# Patient Record
Sex: MALE | URBAN - METROPOLITAN AREA
[De-identification: names, ages, dates, MRNs, and addresses within clinical notes are randomized per-mention and may not be internally consistent; named-entity substitution may affect disease eponyms.]

---

## 2021-02-23 ENCOUNTER — APPOINTMENT (RX ONLY)
Dept: URBAN - METROPOLITAN AREA CLINIC 150 | Facility: CLINIC | Age: 81
Setting detail: DERMATOLOGY
End: 2021-02-23

## 2021-02-23 VITALS — TEMPERATURE: 96.8 F

## 2021-02-23 DIAGNOSIS — I83.1 VARICOSE VEINS OF LOWER EXTREMITIES WITH INFLAMMATION: ICD-10-CM

## 2021-02-23 DIAGNOSIS — Z71.89 OTHER SPECIFIED COUNSELING: ICD-10-CM

## 2021-02-23 PROBLEM — I83.12 VARICOSE VEINS OF LEFT LOWER EXTREMITY WITH INFLAMMATION: Status: ACTIVE | Noted: 2021-02-23

## 2021-02-23 PROBLEM — I83.11 VARICOSE VEINS OF RIGHT LOWER EXTREMITY WITH INFLAMMATION: Status: ACTIVE | Noted: 2021-02-23

## 2021-02-23 PROCEDURE — ? VENIPUNCTURE

## 2021-02-23 PROCEDURE — 36415 COLL VENOUS BLD VENIPUNCTURE: CPT

## 2021-02-23 PROCEDURE — ? ORDER TESTS

## 2021-02-23 PROCEDURE — ? ADDITIONAL NOTES

## 2021-02-23 PROCEDURE — 99203 OFFICE O/P NEW LOW 30 MIN: CPT | Mod: 25

## 2021-02-23 PROCEDURE — ? PRESCRIPTION

## 2021-02-23 PROCEDURE — ? REFERRAL CORRESPONDENCE

## 2021-02-23 PROCEDURE — ? SUNSCREEN RECOMMENDATIONS

## 2021-02-23 PROCEDURE — ? COUNSELING

## 2021-02-23 RX ORDER — CEPHALEXIN 500 MG/1
CAPSULE ORAL
Qty: 14 | Refills: 1 | Status: ERX | COMMUNITY
Start: 2021-02-23

## 2021-02-23 RX ADMIN — CEPHALEXIN: 500 CAPSULE ORAL at 00:00

## 2021-02-23 ASSESSMENT — LOCATION ZONE DERM
LOCATION ZONE: LEG
LOCATION ZONE: LEG

## 2021-02-23 ASSESSMENT — LOCATION DETAILED DESCRIPTION DERM
LOCATION DETAILED: RIGHT DISTAL PRETIBIAL REGION
LOCATION DETAILED: LEFT PROXIMAL PRETIBIAL REGION

## 2021-02-23 ASSESSMENT — LOCATION SIMPLE DESCRIPTION DERM
LOCATION SIMPLE: LEFT PRETIBIAL REGION
LOCATION SIMPLE: RIGHT PRETIBIAL REGION

## 2021-02-23 NOTE — PROCEDURE: ORDER TESTS
Bill For Surgical Tray: no
Billing Type: Third-Party Bill
Clinical Notes (To The Lab): Culture & sensitivity
Expected Date Of Service: 02/23/2021

## 2021-02-23 NOTE — PROCEDURE: ADDITIONAL NOTES
Render Risk Assessment In Note?: no
Detail Level: Simple
Additional Notes: 2/23/21\\nOrdered blood work today in the clinic for this patient as he states he has not had any recent blood work done. Will coordinate care with PCP in [place] as he is seeing him in 2 weeks.\\nDiscussed importance of elevating legs in his recliner when he can. Suggested ace wraps for his legs for extra compression.\\nThis patient lives alone and has a difficult time taking care of himself. Can discuss home health option at follow up visit in 2 weeks.

## 2021-03-01 ENCOUNTER — RX ONLY (OUTPATIENT)
Age: 81
Setting detail: RX ONLY
End: 2021-03-01

## 2021-03-01 RX ORDER — PENICILLIN V POTASSIUM 500 MG/1
TABLET ORAL
Qty: 20 | Refills: 0 | Status: ERX | COMMUNITY
Start: 2021-03-01

## 2021-03-11 ENCOUNTER — APPOINTMENT (RX ONLY)
Dept: URBAN - METROPOLITAN AREA CLINIC 150 | Facility: CLINIC | Age: 81
Setting detail: DERMATOLOGY
End: 2021-03-11

## 2021-03-11 VITALS — TEMPERATURE: 97 F

## 2021-03-11 DIAGNOSIS — I83.1 VARICOSE VEINS OF LOWER EXTREMITIES WITH INFLAMMATION: ICD-10-CM

## 2021-03-11 DIAGNOSIS — Z71.89 OTHER SPECIFIED COUNSELING: ICD-10-CM

## 2021-03-11 PROBLEM — I83.11 VARICOSE VEINS OF RIGHT LOWER EXTREMITY WITH INFLAMMATION: Status: ACTIVE | Noted: 2021-03-11

## 2021-03-11 PROBLEM — I83.12 VARICOSE VEINS OF LEFT LOWER EXTREMITY WITH INFLAMMATION: Status: ACTIVE | Noted: 2021-03-11

## 2021-03-11 PROCEDURE — ? COUNSELING

## 2021-03-11 PROCEDURE — ? ADDITIONAL NOTES

## 2021-03-11 PROCEDURE — ? REFERRAL CORRESPONDENCE

## 2021-03-11 PROCEDURE — 99213 OFFICE O/P EST LOW 20 MIN: CPT

## 2021-03-11 PROCEDURE — ? SUNSCREEN RECOMMENDATIONS

## 2021-03-11 PROCEDURE — ? ORDER TESTS

## 2021-03-11 ASSESSMENT — LOCATION DETAILED DESCRIPTION DERM
LOCATION DETAILED: LEFT PROXIMAL PRETIBIAL REGION
LOCATION DETAILED: RIGHT DISTAL PRETIBIAL REGION

## 2021-03-11 ASSESSMENT — LOCATION SIMPLE DESCRIPTION DERM
LOCATION SIMPLE: LEFT PRETIBIAL REGION
LOCATION SIMPLE: RIGHT PRETIBIAL REGION

## 2021-03-11 ASSESSMENT — LOCATION ZONE DERM
LOCATION ZONE: LEG
LOCATION ZONE: LEG

## 2021-03-11 NOTE — PROCEDURE: MIPS QUALITY
Quality 402: Tobacco Use And Help With Quitting Among Adolescents: Patient screened for tobacco and never smoked
Detail Level: Detailed
Quality 111:Pneumonia Vaccination Status For Older Adults: Pneumococcal Vaccination Previously Received
Quality 226: Preventive Care And Screening: Tobacco Use: Screening And Cessation Intervention: Patient screened for tobacco use and is an ex/non-smoker
Quality 394a: Meningococcal Immunizations For Adolescents: Patient had one dose of meningococcal vaccine (serogroups A, C, W, Y) on or between the patient's 11th and 13th birthdays.
Quality 110: Preventive Care And Screening: Influenza Immunization: Influenza Immunization Administered during Influenza season
Quality 394b: Td/Tdap Immunizations For Adolescents: Patient had one tetanus, diphtheria toxoids and acellular pertussis vaccine (Tdap) on or between the patient's 10th and 13th birthdays.

## 2021-03-11 NOTE — PROCEDURE: ADDITIONAL NOTES
Detail Level: Simple
Additional Notes: GUARDIAN LORIE ORDER/REFERRAL:\\nPlease apply UNNA boots to left and right lower legs twice weekly.
Additional Notes: 3/11/21\\nWill contact [facility] in [place] as this patient needs help at home with his legs since he lives at home alone. He has a hard time being able to reach his legs. He has finished his ABX entirely as prescribed. \\nNew culture completed today and will call patient if needed to start on abx again. \\nApplied Vaseline to his legs today and stressed importance of elevating his legs as often as he can. \\n\\n\\n\\n\\n2/23/21\\nOrdered blood work today in the clinic for this patient as he states he has not had any recent blood work done. Will coordinate care with PCP in [place] as he is seeing him in 2 weeks.\\nDiscussed importance of elevating legs in his recliner when he can. Suggested ace wraps for his legs for extra compression.\\nThis patient lives alone and has a difficult time taking care of himself. Can discuss home health option at follow up visit in 2 weeks.
Render Risk Assessment In Note?: no

## 2021-03-16 ENCOUNTER — RX ONLY (OUTPATIENT)
Age: 81
Setting detail: RX ONLY
End: 2021-03-16

## 2021-03-16 RX ORDER — CEPHALEXIN 500 MG/1
CAPSULE ORAL
Qty: 28 | Refills: 0 | Status: ERX | COMMUNITY
Start: 2021-03-16

## 2021-03-25 ENCOUNTER — APPOINTMENT (RX ONLY)
Dept: URBAN - METROPOLITAN AREA CLINIC 150 | Facility: CLINIC | Age: 81
Setting detail: DERMATOLOGY
End: 2021-03-25

## 2021-03-25 VITALS — TEMPERATURE: 97.2 F

## 2021-03-25 DIAGNOSIS — Z71.89 OTHER SPECIFIED COUNSELING: ICD-10-CM

## 2021-03-25 DIAGNOSIS — I83.1 VARICOSE VEINS OF LOWER EXTREMITIES WITH INFLAMMATION: ICD-10-CM

## 2021-03-25 PROBLEM — I83.12 VARICOSE VEINS OF LEFT LOWER EXTREMITY WITH INFLAMMATION: Status: ACTIVE | Noted: 2021-03-25

## 2021-03-25 PROBLEM — I83.11 VARICOSE VEINS OF RIGHT LOWER EXTREMITY WITH INFLAMMATION: Status: ACTIVE | Noted: 2021-03-25

## 2021-03-25 PROCEDURE — ? SUNSCREEN RECOMMENDATIONS

## 2021-03-25 PROCEDURE — ? ADDITIONAL NOTES

## 2021-03-25 PROCEDURE — ? COUNSELING

## 2021-03-25 PROCEDURE — ? REFERRAL CORRESPONDENCE

## 2021-03-25 PROCEDURE — 99213 OFFICE O/P EST LOW 20 MIN: CPT

## 2021-03-25 PROCEDURE — ? ORDER TESTS

## 2021-03-25 ASSESSMENT — LOCATION DETAILED DESCRIPTION DERM
LOCATION DETAILED: RIGHT DISTAL PRETIBIAL REGION
LOCATION DETAILED: LEFT PROXIMAL PRETIBIAL REGION

## 2021-03-25 ASSESSMENT — LOCATION SIMPLE DESCRIPTION DERM
LOCATION SIMPLE: LEFT PRETIBIAL REGION
LOCATION SIMPLE: RIGHT PRETIBIAL REGION

## 2021-03-25 ASSESSMENT — LOCATION ZONE DERM
LOCATION ZONE: LEG
LOCATION ZONE: LEG

## 2021-03-25 NOTE — PROCEDURE: MIPS QUALITY
Detail Level: Detailed
Quality 110: Preventive Care And Screening: Influenza Immunization: Influenza Immunization Administered during Influenza season
Quality 402: Tobacco Use And Help With Quitting Among Adolescents: Patient screened for tobacco and never smoked
Quality 226: Preventive Care And Screening: Tobacco Use: Screening And Cessation Intervention: Patient screened for tobacco use and is an ex/non-smoker
Quality 394a: Meningococcal Immunizations For Adolescents: Patient had one dose of meningococcal vaccine (serogroups A, C, W, Y) on or between the patient's 11th and 13th birthdays.
Quality 394b: Td/Tdap Immunizations For Adolescents: Patient had one tetanus, diphtheria toxoids and acellular pertussis vaccine (Tdap) on or between the patient's 10th and 13th birthdays.
Quality 111:Pneumonia Vaccination Status For Older Adults: Pneumococcal Vaccination Previously Received

## 2021-03-25 NOTE — PROCEDURE: ADDITIONAL NOTES
Render Risk Assessment In Note?: no
Additional Notes: GUARDIAN LORIE ORDER/REFERRAL:\\nPlease apply UNNA boots to left and right lower legs twice weekly.
Detail Level: Simple
Additional Notes: 3/25/21\\nHe is very happy with his results today. He is improving with home health [facility], coming to his house twice weekly for UNNA boots. \\nContinue to elevate his legs when at home in his recliner.  Please continue with home health and UNNA boots. \\n\\n\\n3/11/21\\nWill contact [facility] in [place] as this patient needs help at home with his legs since he lives at home alone. He has a hard time being able to reach his legs. He has finished his ABX entirely as prescribed. \\nNew culture completed today and will call patient if needed to start on abx again. \\nApplied Vaseline to his legs today and stressed importance of elevating his legs as often as he can. \\n\\n\\n\\n\\n2/23/21\\nOrdered blood work today in the clinic for this patient as he states he has not had any recent blood work done. Will coordinate care with PCP in [place] as he is seeing him in 2 weeks.\\nDiscussed importance of elevating legs in his recliner when he can. Suggested ace wraps for his legs for extra compression.\\nThis patient lives alone and has a difficult time taking care of himself. Can discuss home health option at follow up visit in 2 weeks.

## 2021-04-20 ENCOUNTER — APPOINTMENT (RX ONLY)
Dept: URBAN - METROPOLITAN AREA CLINIC 150 | Facility: CLINIC | Age: 81
Setting detail: DERMATOLOGY
End: 2021-04-20

## 2021-04-20 VITALS — TEMPERATURE: 96.6 F

## 2021-04-20 DIAGNOSIS — I83.1 VARICOSE VEINS OF LOWER EXTREMITIES WITH INFLAMMATION: ICD-10-CM

## 2021-04-20 PROBLEM — I83.12 VARICOSE VEINS OF LEFT LOWER EXTREMITY WITH INFLAMMATION: Status: ACTIVE | Noted: 2021-04-20

## 2021-04-20 PROCEDURE — ? REFERRAL CORRESPONDENCE

## 2021-04-20 PROCEDURE — ? ADDITIONAL NOTES

## 2021-04-20 ASSESSMENT — LOCATION DETAILED DESCRIPTION DERM: LOCATION DETAILED: LEFT PROXIMAL PRETIBIAL REGION

## 2021-04-20 ASSESSMENT — LOCATION ZONE DERM: LOCATION ZONE: LEG

## 2021-04-20 ASSESSMENT — LOCATION SIMPLE DESCRIPTION DERM: LOCATION SIMPLE: LEFT PRETIBIAL REGION

## 2021-04-20 NOTE — PROCEDURE: ADDITIONAL NOTES
Render Risk Assessment In Note?: no
Detail Level: Simple
Additional Notes: 4/20/21\\nInstructed patient to get nails cut done and instructed to get XXXlarge socks and continue to wear all the time. Patient had stockings on for four days. Continue to keep legs elevated in his recliner as possible. Patient take sit bathes. \\n\\n\\n3/25/21\\nHe is very happy with his results today. He is improving with home health [facility], coming to his house twice weekly for UNNA boots. \\nContinue to elevate his legs when at home in his recliner.  Please continue with home health and UNNA boots. \\n\\n\\n3/11/21\\nWill contact [facility] in [place] as this patient needs help at home with his legs since he lives at home alone. He has a hard time being able to reach his legs. He has finished his ABX entirely as prescribed. \\nNew culture completed today and will call patient if needed to start on abx again. \\nApplied Vaseline to his legs today and stressed importance of elevating his legs as often as he can. \\n\\n\\n\\n\\n2/23/21\\nOrdered blood work today in the clinic for this patient as he states he has not had any recent blood work done. Will coordinate care with PCP in [place] as he is seeing him in 2 weeks.\\nDiscussed importance of elevating legs in his recliner when he can. Suggested ace wraps for his legs for extra compression.\\nThis patient lives alone and has a difficult time taking care of himself. Can discuss home health option at follow up visit in 2 weeks.
Additional Notes: GUARDIAN LORIE ORDER/REFERRAL:\\nPlease apply UNNA boots to left and right lower legs twice weekly.

## 2021-04-20 NOTE — PROCEDURE: MIPS QUALITY
Quality 111:Pneumonia Vaccination Status For Older Adults: Pneumococcal Vaccination Previously Received
Quality 110: Preventive Care And Screening: Influenza Immunization: Influenza Immunization Administered during Influenza season
Detail Level: Detailed
Quality 402: Tobacco Use And Help With Quitting Among Adolescents: Patient screened for tobacco and never smoked
Quality 394a: Meningococcal Immunizations For Adolescents: Patient had one dose of meningococcal vaccine (serogroups A, C, W, Y) on or between the patient's 11th and 13th birthdays.
Quality 394b: Td/Tdap Immunizations For Adolescents: Patient had one tetanus, diphtheria toxoids and acellular pertussis vaccine (Tdap) on or between the patient's 10th and 13th birthdays.
Quality 226: Preventive Care And Screening: Tobacco Use: Screening And Cessation Intervention: Patient screened for tobacco use and is an ex/non-smoker

## 2021-05-20 ENCOUNTER — APPOINTMENT (RX ONLY)
Dept: URBAN - METROPOLITAN AREA CLINIC 150 | Facility: CLINIC | Age: 81
Setting detail: DERMATOLOGY
End: 2021-05-20

## 2021-05-20 VITALS — TEMPERATURE: 96.6 F

## 2021-05-20 DIAGNOSIS — I83.1 VARICOSE VEINS OF LOWER EXTREMITIES WITH INFLAMMATION: ICD-10-CM

## 2021-05-20 PROBLEM — I83.12 VARICOSE VEINS OF LEFT LOWER EXTREMITY WITH INFLAMMATION: Status: ACTIVE | Noted: 2021-05-20

## 2021-05-20 PROBLEM — I83.11 VARICOSE VEINS OF RIGHT LOWER EXTREMITY WITH INFLAMMATION: Status: ACTIVE | Noted: 2021-05-20

## 2021-05-20 PROCEDURE — ? COUNSELING

## 2021-05-20 PROCEDURE — ? ADDITIONAL NOTES

## 2021-05-20 PROCEDURE — 99212 OFFICE O/P EST SF 10 MIN: CPT

## 2021-05-20 PROCEDURE — ? REFERRAL CORRESPONDENCE

## 2021-05-20 ASSESSMENT — LOCATION ZONE DERM
LOCATION ZONE: LEG
LOCATION ZONE: LEG

## 2021-05-20 ASSESSMENT — LOCATION DETAILED DESCRIPTION DERM
LOCATION DETAILED: LEFT PROXIMAL PRETIBIAL REGION
LOCATION DETAILED: LEFT PROXIMAL PRETIBIAL REGION
LOCATION DETAILED: RIGHT DISTAL PRETIBIAL REGION

## 2021-05-20 ASSESSMENT — LOCATION SIMPLE DESCRIPTION DERM
LOCATION SIMPLE: LEFT PRETIBIAL REGION
LOCATION SIMPLE: LEFT PRETIBIAL REGION
LOCATION SIMPLE: RIGHT PRETIBIAL REGION

## 2021-05-20 NOTE — PROCEDURE: MIPS QUALITY
Quality 110: Preventive Care And Screening: Influenza Immunization: Influenza Immunization Administered during Influenza season
Detail Level: Detailed
Quality 111:Pneumonia Vaccination Status For Older Adults: Pneumococcal Vaccination Previously Received
Quality 402: Tobacco Use And Help With Quitting Among Adolescents: Patient screened for tobacco and never smoked
Quality 394a: Meningococcal Immunizations For Adolescents: Patient had one dose of meningococcal vaccine (serogroups A, C, W, Y) on or between the patient's 11th and 13th birthdays.
Quality 226: Preventive Care And Screening: Tobacco Use: Screening And Cessation Intervention: Patient screened for tobacco use and is an ex/non-smoker
Quality 394b: Td/Tdap Immunizations For Adolescents: Patient had one tetanus, diphtheria toxoids and acellular pertussis vaccine (Tdap) on or between the patient's 10th and 13th birthdays.

## 2021-05-20 NOTE — PROCEDURE: ADDITIONAL NOTES
Additional Notes: 5/20/21\\nContinuing to improve today. \\nHe is wearing compression stockings as suggested daily. He sleeps in his recliner and elevates his legs when he can. He also is taking sitz baths. His son in law helps him with his stockings.\\n\\n4/20/21\\nInstructed patient to get nails cut done and instructed to get XXXlarge socks and continue to wear all the time. Patient had stockings on for four days. Continue to keep legs elevated in his recliner as possible. Patient take sit bathes. \\n\\n\\n3/25/21\\nHe is very happy with his results today. He is improving with home health [facility], coming to his house twice weekly for UNNA boots. \\nContinue to elevate his legs when at home in his recliner.  Please continue with home health and UNNA boots. \\n\\n\\n3/11/21\\nWill contact [facility] in [place] as this patient needs help at home with his legs since he lives at home alone. He has a hard time being able to reach his legs. He has finished his ABX entirely as prescribed. \\nNew culture completed today and will call patient if needed to start on abx again. \\nApplied Vaseline to his legs today and stressed importance of elevating his legs as often as he can. \\n\\n\\n\\n\\n2/23/21\\nOrdered blood work today in the clinic for this patient as he states he has not had any recent blood work done. Will coordinate care with PCP in [place] as he is seeing him in 2 weeks.\\nDiscussed importance of elevating legs in his recliner when he can. Suggested ace wraps for his legs for extra compression.\\nThis patient lives alone and has a difficult time taking care of himself. Can discuss home health option at follow up visit in 2 weeks.
Render Risk Assessment In Note?: no
Additional Notes: GUARDIAN LORIE ORDER/REFERRAL:\\nPlease apply UNNA boots to left and right lower legs twice weekly.
Detail Level: Simple

## 2021-06-24 ENCOUNTER — APPOINTMENT (RX ONLY)
Dept: URBAN - METROPOLITAN AREA CLINIC 150 | Facility: CLINIC | Age: 81
Setting detail: DERMATOLOGY
End: 2021-06-24

## 2021-06-24 DIAGNOSIS — I83.1 VARICOSE VEINS OF LOWER EXTREMITIES WITH INFLAMMATION: ICD-10-CM

## 2021-06-24 PROBLEM — I83.11 VARICOSE VEINS OF RIGHT LOWER EXTREMITY WITH INFLAMMATION: Status: ACTIVE | Noted: 2021-06-24

## 2021-06-24 PROBLEM — I83.12 VARICOSE VEINS OF LEFT LOWER EXTREMITY WITH INFLAMMATION: Status: ACTIVE | Noted: 2021-06-24

## 2021-06-24 PROCEDURE — ? COUNSELING

## 2021-06-24 PROCEDURE — 99212 OFFICE O/P EST SF 10 MIN: CPT

## 2021-06-24 PROCEDURE — ? ADDITIONAL NOTES

## 2021-06-24 PROCEDURE — ? REFERRAL CORRESPONDENCE

## 2021-06-24 ASSESSMENT — LOCATION SIMPLE DESCRIPTION DERM
LOCATION SIMPLE: LEFT PRETIBIAL REGION
LOCATION SIMPLE: RIGHT PRETIBIAL REGION
LOCATION SIMPLE: LEFT PRETIBIAL REGION

## 2021-06-24 ASSESSMENT — LOCATION ZONE DERM
LOCATION ZONE: LEG
LOCATION ZONE: LEG

## 2021-06-24 ASSESSMENT — LOCATION DETAILED DESCRIPTION DERM
LOCATION DETAILED: RIGHT DISTAL PRETIBIAL REGION
LOCATION DETAILED: LEFT PROXIMAL PRETIBIAL REGION
LOCATION DETAILED: LEFT PROXIMAL PRETIBIAL REGION

## 2021-06-24 NOTE — PROCEDURE: ADDITIONAL NOTES
Additional Notes: .\\n06/24/21\\nPt states he seen improvement, is concerned about the dry skin on both legs (flaky), no pain. Ok to continue with compression stockings, continues to keep legs elevated on recliner. Stressed to cut toe nails. Recommended to get a device to help apply stockings. If pt has sores, advised pt to call and make appt sooner. \\n\\n5/20/21\\nContinuing to improve today. \\nHe is wearing compression stockings as suggested daily. He sleeps in his recliner and elevates his legs when he can. He also is taking sitz baths. His son in law helps him with his stockings.\\n\\n4/20/21\\nInstructed patient to get nails cut done and instructed to get XXXlarge socks and continue to wear all the time. Patient had stockings on for four days. Continue to keep legs elevated in his recliner as possible. Patient take sit bathes. \\n\\n\\n3/25/21\\nHe is very happy with his results today. He is improving with home health [facility], coming to his house twice weekly for UNNA boots. \\nContinue to elevate his legs when at home in his recliner.  Please continue with home health and UNNA boots. \\n\\n\\n3/11/21\\nWill contact [facility] in [place] as this patient needs help at home with his legs since he lives at home alone. He has a hard time being able to reach his legs. He has finished his ABX entirely as prescribed. \\nNew culture completed today and will call patient if needed to start on abx again. \\nApplied Vaseline to his legs today and stressed importance of elevating his legs as often as he can. \\n\\n\\n\\n\\n2/23/21\\nOrdered blood work today in the clinic for this patient as he states he has not had any recent blood work done. Will coordinate care with PCP in [place] as he is seeing him in 2 weeks.\\nDiscussed importance of elevating legs in his recliner when he can. Suggested ace wraps for his legs for extra compression.\\nThis patient lives alone and has a difficult time taking care of himself. Can discuss home health option at follow up visit in 2 weeks.
Additional Notes: GUARDIAN LORIE ORDER/REFERRAL:\\nPlease apply UNNA boots to left and right lower legs twice weekly.
Render Risk Assessment In Note?: no
Detail Level: Simple

## 2021-06-24 NOTE — PROCEDURE: MIPS QUALITY
Quality 111:Pneumonia Vaccination Status For Older Adults: Pneumococcal Vaccination Previously Received
Quality 226: Preventive Care And Screening: Tobacco Use: Screening And Cessation Intervention: Patient screened for tobacco use and is an ex/non-smoker
Quality 394a: Meningococcal Immunizations For Adolescents: Patient had one dose of meningococcal vaccine (serogroups A, C, W, Y) on or between the patient's 11th and 13th birthdays.
Quality 402: Tobacco Use And Help With Quitting Among Adolescents: Patient screened for tobacco and never smoked
Detail Level: Detailed
Quality 110: Preventive Care And Screening: Influenza Immunization: Influenza Immunization Administered during Influenza season
Quality 394b: Td/Tdap Immunizations For Adolescents: Patient had one tetanus, diphtheria toxoids and acellular pertussis vaccine (Tdap) on or between the patient's 10th and 13th birthdays.

## 2023-09-15 NOTE — PROCEDURE: MIPS QUALITY
Medication:   Requested Prescriptions     Pending Prescriptions Disp Refills    busPIRone (BUSPAR) 10 MG tablet 90 tablet 3     Sig: Take 1 tablet by mouth 3 times daily as needed (anxiety)        Last Filled:  6/14/2023    Patient Phone Number: [phone] (home)     Last appt: 6/26/2023   Next appt: Visit date not found    Last OARRS:       10/11/2022     1:50 PM   RX Monitoring   Periodic Controlled Substance Monitoring No signs of potential drug abuse or diversion identified.
Please mandy up correctly
Quality 394a: Meningococcal Immunizations For Adolescents: Patient had one dose of meningococcal vaccine (serogroups A, C, W, Y) on or between the patient's 11th and 13th birthdays.
Quality 402: Tobacco Use And Help With Quitting Among Adolescents: Patient screened for tobacco and is a smoker AND received Cessation Counseling
Quality 110: Preventive Care And Screening: Influenza Immunization: Influenza Immunization Administered during Influenza season
Quality 111:Pneumonia Vaccination Status For Older Adults: Pneumococcal Vaccination Previously Received
Detail Level: Detailed
Quality 394b: Td/Tdap Immunizations For Adolescents: Patient had one tetanus, diphtheria toxoids and acellular pertussis vaccine (Tdap) on or between the patient's 10th and 13th birthdays.
Quality 226: Preventive Care And Screening: Tobacco Use: Screening And Cessation Intervention: Patient screened for tobacco use, is a smoker AND received Cessation Counseling